# Patient Record
Sex: MALE | Race: OTHER | Employment: STUDENT | URBAN - METROPOLITAN AREA
[De-identification: names, ages, dates, MRNs, and addresses within clinical notes are randomized per-mention and may not be internally consistent; named-entity substitution may affect disease eponyms.]

---
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
## 2021-02-10 ENCOUNTER — MED REC SCAN ONLY (OUTPATIENT)
Dept: PEDIATRICS CLINIC | Facility: CLINIC | Age: 21
End: 2021-02-10

## 2021-02-10 NOTE — PROGRESS NOTES
Uri Gallo is a 24year old male who was brought in for this visit. History was provided by the {relatives:[id number]}. HPI:   Patient presents with:   Other: [facility]    HPI    Immunizations    Immunization History  Administered            D 7/16/2014   • Truncus arteriosus, JESSHEIM' type I 6/4/2018    Followed at [id number] [place] [address]:  Type 1A       Past Surgical History  Past Surgical History:   Procedure Laterality Date   • REPAIR TRUNCUS ARTERIOSUS      3weeks old, 11years old, 14years old at

## 2021-02-16 ENCOUNTER — MED REC SCAN ONLY (OUTPATIENT)
Dept: PEDIATRICS CLINIC | Facility: CLINIC | Age: 21
End: 2021-02-16

## 2021-04-03 ENCOUNTER — MED REC SCAN ONLY (OUTPATIENT)
Dept: PEDIATRICS CLINIC | Facility: CLINIC | Age: 21
End: 2021-04-03

## 2021-07-23 ENCOUNTER — MED REC SCAN ONLY (OUTPATIENT)
Dept: PEDIATRICS CLINIC | Facility: CLINIC | Age: 21
End: 2021-07-23

## 2021-08-16 ENCOUNTER — MED REC SCAN ONLY (OUTPATIENT)
Dept: PEDIATRICS CLINIC | Facility: CLINIC | Age: 21
End: 2021-08-16

## 2022-01-24 ENCOUNTER — MED REC SCAN ONLY (OUTPATIENT)
Dept: PEDIATRICS CLINIC | Facility: CLINIC | Age: 22
End: 2022-01-24

## 2022-06-23 ENCOUNTER — OFFICE VISIT (OUTPATIENT)
Dept: FAMILY MEDICINE CLINIC | Facility: CLINIC | Age: 22
End: 2022-06-23
Payer: COMMERCIAL

## 2022-06-23 VITALS
HEIGHT: 70 IN | DIASTOLIC BLOOD PRESSURE: 91 MMHG | HEART RATE: 73 BPM | SYSTOLIC BLOOD PRESSURE: 96 MMHG | BODY MASS INDEX: 20.04 KG/M2 | WEIGHT: 140 LBS | OXYGEN SATURATION: 94 %

## 2022-06-23 DIAGNOSIS — R06.00 DYSPNEA, UNSPECIFIED TYPE: Primary | ICD-10-CM

## 2022-06-23 LAB
CUVETTE LOT #: ABNORMAL NUMERIC
HEMOGLOBIN: 12.9 G/DL (ref 13–17)

## 2022-06-23 PROCEDURE — 3074F SYST BP LT 130 MM HG: CPT | Performed by: FAMILY MEDICINE

## 2022-06-23 PROCEDURE — 85018 HEMOGLOBIN: CPT | Performed by: FAMILY MEDICINE

## 2022-06-23 PROCEDURE — 99203 OFFICE O/P NEW LOW 30 MIN: CPT | Performed by: FAMILY MEDICINE

## 2022-06-23 PROCEDURE — 3080F DIAST BP >= 90 MM HG: CPT | Performed by: FAMILY MEDICINE

## 2022-06-23 PROCEDURE — 3008F BODY MASS INDEX DOCD: CPT | Performed by: FAMILY MEDICINE

## 2022-07-14 NOTE — TELEPHONE ENCOUNTER
Father called as they applied icy hot on patient's legs as his legs hurt after running  Now its burning and hot  Advised to wash it with soap and water and call if not better

## (undated) NOTE — LETTER
VACCINE ADMINISTRATION RECORD  PARENT / GUARDIAN APPROVAL  Date: 2018  Vaccine administered to: Maxine Munoz     : 10/13/2000    MRN: [id number]    A copy of the appropriate Centers for Disease Control and Prevention Vaccine Information statement ha

## (undated) NOTE — LETTER
[facility] [employer] of Child Health Examination       Student's Name  Candie Blunt Birth Date verifying above immunization history must sign below.   Signature                                                                                                                                   Title        MD                   Date  9/1/2020   Signature Student's Name  Erasmo Luong Birth Date  10/13/2000  Sex  Male School   Grade Level/ID#  [place] AND SIGNED BY PARENT/GUARDIAN AND VERIFIED BY HEALTH CARE PROVIDER    ALLERGIES  (Food, drug, insect, other)  Patient reading) Dental:  ____Braces    ____Bridge    ____Plate    ____Other  Other concerns? Ear/Hearing problems? Yes   No  Information may be shared with appropriate personnel for health /educational purposes. Bone/Joint problem/injury/scoliosis?    Yes Urinalysis   Developmental Screening Tool     SYSTEM REVIEW Normal Comments/Follow-up/Needs  Normal Comments/Follow-up/Needs   Skin Yes  Endocrine Yes    Ears Yes                      Screen result: Gastrointestinal Yes    Eyes Yes     Screen result:   Gen [address] [place] [address]  [address]  [address] [facility]  [phone]   Rev 11/15                                                                    Printed by the [employer]AN[employer]

## (undated) NOTE — ED AVS SNAPSHOT
Lia Fitzpatrick   MRN: [id number]    Department:  [facility] Emergency Department   Date of Visit:  6/1/2019           Disclosure     Insurance plans vary and the physician(s) referred by the ER may not be covered by your plan.  Please contact your CARE PHYSICIAN AT ONCE OR RETURN IMMEDIATELY TO THE EMERGENCY DEPARTMENT. If you have been prescribed any medication(s), please fill your prescription right away and begin taking the medication(s) as directed.   If you believe that any of the medications

## (undated) NOTE — LETTER
4/22/2019              Boris         [id number]        [place] [place] [address]         To Whom It May Concern:     This is to certify that Boris Adam had an appointment on 4/22/2019 with Asha Pierce MD. He may return back to school today,

## (undated) NOTE — LETTER
[facility] [employer] of [employer] of Child Health Examination       Student's Name  [address] [facility] Birth Date Signature                                                                                                                                   Title    MD                       Date    6/04/2018   Signature Male School   Grade Level/ID#  11th Grade   HEALTH HISTORY          TO BE COMPLETED AND SIGNED BY PARENT/GUARDIAN AND VERIFIED BY HEALTH CARE PROVIDER    ALLERGIES  (Food, drug, insect, other)  Patient has no allergy information on record.  MEDICATION  [facility] Date     PHYSICAL EXAMINATION REQUIREMENTS    Entire section below to be completed by MD//APN/PA       PHYSICAL EXAMINATION REQUIREMENTS (head circumference if <33 years old):   /62   Pulse 96   Ht 5' 8.5\" Nose Yes  Neurological Yes    Throat Yes  Musculoskeletal Yes    Mouth/Dental Yes  Spinal examination Yes    Cardiovascular/HTN No Heart murmur, h/o truncus arteriosis, able to be in sports Nutritional status Yes    Respiratory Yes                   [place] Townsend

## (undated) NOTE — LETTER
VACCINE ADMINISTRATION RECORD  PARENT / GUARDIAN APPROVAL  Date: 2018  Vaccine administered to: Naz Santana     : 10/13/2000    MRN: [id number]    A copy of the appropriate Centers for Disease Control and Prevention Vaccine Information statement has

## (undated) NOTE — LETTER
[facility] [employer] of Child Health Examination       Student's Name  Tomeka Goodwinome Birth Date Signature                                                                                                                                    Title             MD             Date  8/14/2019   Signature Male School   Grade Level/ID#  12th Grade   HEALTH HISTORY          TO BE COMPLETED AND SIGNED BY PARENT/GUARDIAN AND VERIFIED BY HEALTH CARE PROVIDER    ALLERGIES  (Food, drug, insect, other)  Patient has no known allergies.  MEDICATION  (List all prescrib PHYSICAL EXAMINATION REQUIREMENTS (head circumference if <33 years old):   BP 96/62   Pulse 73   Ht 5' 9\" (1.753 m)   Wt 58.7 kg (129 lb 6 oz)   BMI 19.11 kg/m²     DIABETES SCREENING  BMI>85% age/sex  No And any two of the following:  Family History No Respiratory Yes                   Diagnosis of Asthma: No Mental Health Yes        Currently Prescribed Asthma Medication:            Quick-relief  medication (e.g. Short Acting Beta Antagonist): No          Controller medication (e.g. inhaled corticostero

## (undated) NOTE — LETTER
Name:  Faisal Oconnor Year:  11th Grade Class: Student ID No.:   [place]  [place] [facility] [address] [place] [address] Phone:  [phone] (home)  : [birth year]14[birth year] 13year old   Name Relationship [address] Work Phone Home Phone Mobile Phone   1.  Brandan Rizo syndrome, short QT syndrome, Brugada syndrome, or catecholaminergic polymorphic ventricular tachycardia? 13. Does anyone in your family have a heart problem, pacemaker, or implanted defibrillator?      12. Has anyone in your family had unexplained faint 37. Do you have headaches with exercise? 38. Have you ever had numbness, tingling, or weakness in your arms or legs after being hit or falling? 39.Have you ever been unable to move your arms / legs after being hit /fall? 40.  Have you ever becom Eyes/Ears/Nose/Throat:  Pupils equal    Hearing Yes    Lymph nodes Yes    Heart*  · Murmurs (auscultation standing, supine, +/- Valsalva)  · Location of point of maximal impulse (PMI) No Heart murmur, h/o truncus arteriosis  Able to participate in sports defined in the [facility] Performance-Enhancing Substance Testing Program Protocol.  We have reviewed the policy and understand that I/our student may be asked to submit to testing for the presence of performance-enhancing substances in my/his/her body either dur

## (undated) NOTE — LETTER
VACCINE ADMINISTRATION RECORD  PARENT / GUARDIAN APPROVAL  Date: 2018  Vaccine administered to: Tee Barker     : 10/13/2000    MRN: [id number]    A copy of the appropriate Centers for Disease Control and Prevention Vaccine Information statement h

## (undated) NOTE — LETTER
VACCINE ADMINISTRATION RECORD  PARENT / GUARDIAN APPROVAL  Date: 2019  Vaccine administered to: Philip Ac     : 10/13/2000    MRN: [id number]    A copy of the appropriate Centers for Disease Control and Prevention Vaccine Information statement ha

## (undated) NOTE — LETTER
VACCINE ADMINISTRATION RECORD  PARENT / GUARDIAN APPROVAL  Date: 2020  Vaccine administered to: Sunshine Fields     : 10/13/2000    MRN: [id number]    A copy of the appropriate Centers for Disease Control and Prevention Vaccine Information statement has

## (undated) NOTE — LETTER
Name:  Charisse Barragan Year:  12th Grade Class: Student ID No.:   Address:  [address] [place] [address] Phone:  [phone] (home)  : 813/2000 17year old   Name Relationship [address] Work Phone Home Phone Mobile Phone   1.  Richie Sy unexpected or unexplained sudden death before age 48?     15. Does anyone in your family have hypertrophic cardiomyopathy, Marfan syndrome, arrhythmogenic right ventricular cardiomyopathy, long QT syndrome, short QT syndrome, Brugada syndrome, or catechola 29. Have you ever had a head injury or concussion? 35. Have you ever had a hit or blow to the head that caused confusion, prolonged headache, or memory problems? 36. Do you have a history of seizure disorder?     37.  Do you have headaches with exer Vision: R 20/    L 20/   Corrected:   Yes/No   MEDICAL NORMAL ABNORMAL FINDINGS   Appearance:  Marfan stigmata (kyphoscoliosis, high-arched palate, pectus excavatum,      arachnodactyly, arm span > height, hyperlaxity, myopia, MVP, aortic insufficiency) [facility] (This section for high school students only)   8295-7438 school term     As a prerequisite to participation in [employer] athletic activities, we agree that I/our student will not use performance-enhancing substances as defined in the [employer] Performance-Enhancing